# Patient Record
Sex: MALE | Race: WHITE | NOT HISPANIC OR LATINO | Employment: OTHER | URBAN - METROPOLITAN AREA
[De-identification: names, ages, dates, MRNs, and addresses within clinical notes are randomized per-mention and may not be internally consistent; named-entity substitution may affect disease eponyms.]

---

## 2019-11-25 ENCOUNTER — OFFICE VISIT (OUTPATIENT)
Dept: UROLOGY | Facility: CLINIC | Age: 74
End: 2019-11-25
Payer: MEDICARE

## 2019-11-25 VITALS
HEART RATE: 82 BPM | WEIGHT: 230 LBS | RESPIRATION RATE: 20 BRPM | HEIGHT: 73 IN | DIASTOLIC BLOOD PRESSURE: 80 MMHG | SYSTOLIC BLOOD PRESSURE: 144 MMHG | BODY MASS INDEX: 30.48 KG/M2

## 2019-11-25 DIAGNOSIS — N40.1 BPH WITH URINARY OBSTRUCTION: Primary | ICD-10-CM

## 2019-11-25 DIAGNOSIS — N13.8 BPH WITH URINARY OBSTRUCTION: Primary | ICD-10-CM

## 2019-11-25 LAB
BILIRUB UR QL STRIP: NEGATIVE
GLUCOSE UR QL STRIP: NEGATIVE
KETONES UR QL STRIP: NEGATIVE
LEUKOCYTE ESTERASE UR QL STRIP: NEGATIVE
PH, POC UA: 5.5
POC AMORP, URINE: ABNORMAL
POC BACTI, URINE: ABNORMAL
POC BLOOD, URINE: NEGATIVE
POC CASTS, URINE: ABNORMAL
POC CRYST, URINE: ABNORMAL
POC EPITH, URINE: ABNORMAL
POC HCG, URINE: ABNORMAL
POC HYALIN, URINE: ABNORMAL LPF
POC MUCUS, URINE: ABNORMAL
POC NITRATES, URINE: NEGATIVE
POC OTHER, URINE: ABNORMAL
POC RBC, URINE: ABNORMAL HPF
POC RESIDUAL URINE VOLUME: 30 ML (ref 0–100)
POC WBC, URINE: ABNORMAL HPF
PROT UR QL STRIP: NEGATIVE
SP GR UR STRIP: 1.02 (ref 1–1.03)
UROBILINOGEN UR STRIP-ACNC: 0.2 (ref 0.3–2.2)

## 2019-11-25 PROCEDURE — 99214 OFFICE O/P EST MOD 30 MIN: CPT | Mod: 25,S$GLB,, | Performed by: NURSE PRACTITIONER

## 2019-11-25 PROCEDURE — 81001 POCT URINALYSIS (W/MICRO OPTION): ICD-10-PCS | Mod: S$GLB,,, | Performed by: NURSE PRACTITIONER

## 2019-11-25 PROCEDURE — 51798 POCT BLADDER SCAN: ICD-10-PCS | Mod: S$GLB,,, | Performed by: NURSE PRACTITIONER

## 2019-11-25 PROCEDURE — 1159F MED LIST DOCD IN RCRD: CPT | Mod: S$GLB,,, | Performed by: NURSE PRACTITIONER

## 2019-11-25 PROCEDURE — 81001 URINALYSIS AUTO W/SCOPE: CPT | Mod: S$GLB,,, | Performed by: NURSE PRACTITIONER

## 2019-11-25 PROCEDURE — 1159F PR MEDICATION LIST DOCUMENTED IN MEDICAL RECORD: ICD-10-PCS | Mod: S$GLB,,, | Performed by: NURSE PRACTITIONER

## 2019-11-25 PROCEDURE — 99214 PR OFFICE/OUTPT VISIT, EST, LEVL IV, 30-39 MIN: ICD-10-PCS | Mod: 25,S$GLB,, | Performed by: NURSE PRACTITIONER

## 2019-11-25 PROCEDURE — 51798 US URINE CAPACITY MEASURE: CPT | Mod: S$GLB,,, | Performed by: NURSE PRACTITIONER

## 2019-11-25 RX ORDER — QUINAPRIL 20 MG/1
TABLET ORAL
Refills: 1 | COMMUNITY
Start: 2019-09-20

## 2019-11-25 RX ORDER — GABAPENTIN 800 MG/1
TABLET ORAL
Refills: 1 | COMMUNITY
Start: 2019-09-20

## 2019-11-25 RX ORDER — ALFUZOSIN HYDROCHLORIDE 10 MG/1
10 TABLET, EXTENDED RELEASE ORAL
Qty: 90 TABLET | Refills: 0 | Status: SHIPPED | OUTPATIENT
Start: 2019-11-25 | End: 2020-02-27

## 2019-11-25 RX ORDER — ASPIRIN 81 MG/1
81 TABLET ORAL DAILY
COMMUNITY

## 2019-11-25 RX ORDER — DULOXETIN HYDROCHLORIDE 60 MG/1
CAPSULE, DELAYED RELEASE ORAL
Refills: 1 | COMMUNITY
Start: 2019-09-20

## 2019-11-25 RX ORDER — AMLODIPINE BESYLATE 5 MG/1
5 TABLET ORAL DAILY
COMMUNITY

## 2019-11-25 RX ORDER — TAMSULOSIN HYDROCHLORIDE 0.4 MG/1
0.4 CAPSULE ORAL DAILY
COMMUNITY
End: 2019-11-25

## 2019-11-25 RX ORDER — OMEPRAZOLE 40 MG/1
40 CAPSULE, DELAYED RELEASE ORAL DAILY
COMMUNITY

## 2019-11-25 RX ORDER — SIMVASTATIN 40 MG/1
40 TABLET, FILM COATED ORAL NIGHTLY
COMMUNITY

## 2019-11-25 NOTE — PROGRESS NOTES
Subjective:       Patient ID: Ricki Guevara is a 74 y.o. male.    Chief Complaint: Benign Prostatic Hypertrophy (3 mth f/U . States he is taking flomax every 3 days due to it makes him dizzy)      HPI: 74-year-old male with history of Peyronie's and BPH with obstruction, last seen 2-3 months ago.  Patient presents today complaining of adverse affects to Flomax.  Patient states he has been getting dizzy while on Flomax.  States when he lays in bed he might have episodes of dizziness.  Also states if he is in a squatting position or sitting position any stands up he will get dizzy.  Patient was seen by Dr. Staples the cardiologist and had cardiac clearance.  Patient states he had stopped the Flomax and had improvement in symptoms.  He is currently taking Flomax every 3rd day due to symptoms.  When he is not on Flomax for longer than that, he will notice a weaker stream.  Denies pain or burning with urination.  Denies blood in his urine.  Patient also has a history of Peyronie disease he is on the verapamil organo gel, which is working well for him.  No other urinary complaints.  Other health problems are stable at this time.      Past Medical History:   Past Medical History:   Diagnosis Date    CAD (coronary artery disease)     GERD (gastroesophageal reflux disease)     History of skin cancer     History of TB (tuberculosis)     HTN (hypertension)     Hyperlipidemia     Kidney stones     Neuropathy     Shingles     Sleep apnea        Past Surgical Historical:   Past Surgical History:   Procedure Laterality Date    HEMORRHOID SURGERY      multiple fatty tumors removed      skin cancer removal      TONSILLECTOMY          Medications:   Medication List with Changes/Refills   New Medications    ALFUZOSIN (UROXATRAL) 10 MG TB24    Take 1 tablet (10 mg total) by mouth daily with breakfast.   Current Medications    AMLODIPINE (NORVASC) 5 MG TABLET    Take 5 mg by mouth once daily.    ASPIRIN (ECOTRIN) 81 MG EC  TABLET    Take 81 mg by mouth once daily.    DULOXETINE (CYMBALTA) 60 MG CAPSULE    TK ONE C PO ONCE D    GABAPENTIN (NEURONTIN) 800 MG TABLET    TK 1 T PO BID PRN    OMEPRAZOLE (PRILOSEC) 40 MG CAPSULE    Take 40 mg by mouth once daily.    QUINAPRIL (ACCUPRIL) 20 MG TABLET    TK 1 T PO BID    SIMVASTATIN (ZOCOR) 40 MG TABLET    Take 40 mg by mouth every evening.   Discontinued Medications    TAMSULOSIN (FLOMAX) 0.4 MG CAP    Take 0.4 mg by mouth once daily.        Past Social History:   Social History     Socioeconomic History    Marital status:      Spouse name: Not on file    Number of children: Not on file    Years of education: Not on file    Highest education level: Not on file   Occupational History    Not on file   Social Needs    Financial resource strain: Not on file    Food insecurity:     Worry: Not on file     Inability: Not on file    Transportation needs:     Medical: Not on file     Non-medical: Not on file   Tobacco Use    Smoking status: Former Smoker    Smokeless tobacco: Never Used   Substance and Sexual Activity    Alcohol use: Yes     Comment: rarely    Drug use: Not on file    Sexual activity: Not on file   Lifestyle    Physical activity:     Days per week: Not on file     Minutes per session: Not on file    Stress: Not on file   Relationships    Social connections:     Talks on phone: Not on file     Gets together: Not on file     Attends Buddhism service: Not on file     Active member of club or organization: Not on file     Attends meetings of clubs or organizations: Not on file     Relationship status: Not on file   Other Topics Concern    Not on file   Social History Narrative    Not on file       Allergies:   Review of patient's allergies indicates:   Allergen Reactions    Tetanus vaccines and toxoid         Family History: History reviewed. No pertinent family history.     Review of Systems:  Review of Systems   Constitutional: Negative for activity change and  appetite change.   HENT: Negative for congestion and dental problem.    Eyes: Negative for visual disturbance.   Respiratory: Negative for chest tightness and shortness of breath.    Cardiovascular: Negative for chest pain.   Gastrointestinal: Negative for abdominal distention and abdominal pain.   Genitourinary: Negative for decreased urine volume, difficulty urinating, discharge, dysuria, enuresis, flank pain, frequency, genital sores, hematuria, penile pain, penile swelling, scrotal swelling, testicular pain and urgency.   Musculoskeletal: Negative for back pain and neck pain.   Skin: Negative for color change.   Neurological: Positive for light-headedness. Negative for dizziness.   Hematological: Negative for adenopathy.   Psychiatric/Behavioral: Negative for agitation, behavioral problems and confusion.       Physical Exam:  Physical Exam   Nursing note and vitals reviewed.  Constitutional: He is oriented to person, place, and time. He appears well-developed and well-nourished.   HENT:   Head: Normocephalic.   Eyes: Pupils are equal, round, and reactive to light.   Neck: Normal range of motion. Neck supple.   Cardiovascular: Normal rate, regular rhythm and normal heart sounds.    Pulmonary/Chest: Effort normal and breath sounds normal.   Abdominal: Soft. Bowel sounds are normal.   Musculoskeletal: Normal range of motion.   Neurological: He is alert and oriented to person, place, and time.   Skin: Skin is warm and dry.     Psychiatric: He has a normal mood and affect. His behavior is normal.     Bladder scan:  30 cc  Urinalysis:  Normal    Assessment/Plan:   1.  BPH with obstruction:  Patient is having dizziness with Flomax.  We will do a trial dose of Uroxatral 10 mg daily.  One the patient follow-up in a month, however the patient would like to wait until after January to follow-up.  Instructed that if he has dizziness with Uroxatral he can go to every other day or every 3rd day and he is doing with Flomax.   Or if it is severe he can go back to the Flomax until we can follow-up in make adjustments.  Stated understanding.  2.  Peyronie disease:  Patient is doing well on verapamil organo gel.  Continue that as directed.  Will plan follow-up in 2 months, sooner if needed.  Problem List Items Addressed This Visit     None      Visit Diagnoses     BPH with urinary obstruction    -  Primary    Relevant Medications    alfuzosin (UROXATRAL) 10 mg Tb24    Other Relevant Orders    POCT Bladder Scan    POCT Urinalysis (w/Micro Option)

## 2020-02-27 DIAGNOSIS — N40.1 BPH WITH URINARY OBSTRUCTION: ICD-10-CM

## 2020-02-27 DIAGNOSIS — N13.8 BPH WITH URINARY OBSTRUCTION: ICD-10-CM

## 2020-02-27 RX ORDER — ALFUZOSIN HYDROCHLORIDE 10 MG/1
TABLET, EXTENDED RELEASE ORAL
Qty: 90 TABLET | Refills: 0 | Status: SHIPPED | OUTPATIENT
Start: 2020-02-27 | End: 2020-06-12